# Patient Record
Sex: FEMALE | NOT HISPANIC OR LATINO
[De-identification: names, ages, dates, MRNs, and addresses within clinical notes are randomized per-mention and may not be internally consistent; named-entity substitution may affect disease eponyms.]

---

## 2017-01-12 ENCOUNTER — RECORDS - HEALTHEAST (OUTPATIENT)
Dept: ADMINISTRATIVE | Facility: OTHER | Age: 62
End: 2017-01-12

## 2017-02-14 ENCOUNTER — AMBULATORY - HEALTHEAST (OUTPATIENT)
Dept: NEUROSURGERY | Facility: CLINIC | Age: 62
End: 2017-02-14

## 2017-02-14 ENCOUNTER — OFFICE VISIT - HEALTHEAST (OUTPATIENT)
Dept: NEUROSURGERY | Facility: CLINIC | Age: 62
End: 2017-02-14

## 2017-02-14 DIAGNOSIS — M54.16 LEFT LUMBAR RADICULOPATHY: ICD-10-CM

## 2017-02-14 DIAGNOSIS — R29.898 WEAKNESS OF LEFT LEG: ICD-10-CM

## 2017-02-14 RX ORDER — DULOXETIN HYDROCHLORIDE 60 MG/1
60 CAPSULE, DELAYED RELEASE ORAL 2 TIMES DAILY
Status: SHIPPED | COMMUNITY
Start: 2017-02-14

## 2017-02-14 RX ORDER — PROPRANOLOL HYDROCHLORIDE 10 MG/1
10 TABLET ORAL 3 TIMES DAILY
Status: SHIPPED | COMMUNITY
Start: 2017-02-14

## 2017-02-14 RX ORDER — GABAPENTIN 300 MG/1
300 CAPSULE ORAL 3 TIMES DAILY
Status: SHIPPED | COMMUNITY
Start: 2017-02-14

## 2017-02-14 ASSESSMENT — MIFFLIN-ST. JEOR: SCORE: 1551.25

## 2017-02-19 ENCOUNTER — AMBULATORY - HEALTHEAST (OUTPATIENT)
Dept: NEUROSURGERY | Facility: CLINIC | Age: 62
End: 2017-02-19

## 2017-02-24 ENCOUNTER — COMMUNICATION - HEALTHEAST (OUTPATIENT)
Dept: NEUROSURGERY | Facility: CLINIC | Age: 62
End: 2017-02-24

## 2017-02-28 ENCOUNTER — RECORDS - HEALTHEAST (OUTPATIENT)
Dept: RADIOLOGY | Facility: CLINIC | Age: 62
End: 2017-02-28

## 2017-03-09 ENCOUNTER — AMBULATORY - HEALTHEAST (OUTPATIENT)
Dept: NEUROSURGERY | Facility: CLINIC | Age: 62
End: 2017-03-09

## 2017-03-22 ENCOUNTER — HOSPITAL ENCOUNTER (OUTPATIENT)
Dept: PHYSICAL MEDICINE AND REHAB | Facility: CLINIC | Age: 62
Discharge: HOME | End: 2017-03-22
Attending: NEUROLOGICAL SURGERY

## 2017-03-22 DIAGNOSIS — R29.898 WEAKNESS OF LEFT LEG: ICD-10-CM

## 2017-03-22 DIAGNOSIS — M54.16 LEFT LUMBAR RADICULOPATHY: ICD-10-CM

## 2017-04-11 ENCOUNTER — AMBULATORY - HEALTHEAST (OUTPATIENT)
Dept: NEUROSURGERY | Facility: CLINIC | Age: 62
End: 2017-04-11

## 2017-04-11 ENCOUNTER — OFFICE VISIT - HEALTHEAST (OUTPATIENT)
Dept: NEUROSURGERY | Facility: CLINIC | Age: 62
End: 2017-04-11

## 2017-04-11 ENCOUNTER — COMMUNICATION - HEALTHEAST (OUTPATIENT)
Dept: NEUROSURGERY | Facility: CLINIC | Age: 62
End: 2017-04-11

## 2017-04-11 DIAGNOSIS — M48.061 SPINAL STENOSIS OF LUMBAR REGION AT MULTIPLE LEVELS: ICD-10-CM

## 2017-04-11 DIAGNOSIS — M54.16 LEFT LUMBAR RADICULOPATHY: ICD-10-CM

## 2017-04-11 DIAGNOSIS — M43.16 SPONDYLOLISTHESIS AT L4-L5 LEVEL: ICD-10-CM

## 2017-04-11 DIAGNOSIS — G95.20 CERVICAL CORD COMPRESSION WITH MYELOPATHY (H): ICD-10-CM

## 2017-04-11 RX ORDER — AMLODIPINE BESYLATE 10 MG/1
TABLET ORAL
Status: SHIPPED | COMMUNITY
Start: 2017-03-30

## 2017-04-11 RX ORDER — VALACYCLOVIR HYDROCHLORIDE 1 G/1
TABLET, FILM COATED ORAL
Status: SHIPPED | COMMUNITY
Start: 2017-02-09

## 2017-04-11 ASSESSMENT — MIFFLIN-ST. JEOR: SCORE: 1548.53

## 2017-04-12 ENCOUNTER — RECORDS - HEALTHEAST (OUTPATIENT)
Dept: RADIOLOGY | Facility: CLINIC | Age: 62
End: 2017-04-12

## 2017-04-12 ENCOUNTER — COMMUNICATION - HEALTHEAST (OUTPATIENT)
Dept: NEUROSURGERY | Facility: CLINIC | Age: 62
End: 2017-04-12

## 2017-04-14 RX ORDER — OXYCODONE AND ACETAMINOPHEN 5; 325 MG/1; MG/1
1 TABLET ORAL EVERY 6 HOURS PRN
Qty: 30 TABLET | Refills: 0 | Status: SHIPPED | OUTPATIENT
Start: 2017-04-14

## 2017-04-24 ENCOUNTER — AMBULATORY - HEALTHEAST (OUTPATIENT)
Dept: NEUROSURGERY | Facility: CLINIC | Age: 62
End: 2017-04-24

## 2017-05-22 ENCOUNTER — AMBULATORY - HEALTHEAST (OUTPATIENT)
Dept: NEUROSURGERY | Facility: CLINIC | Age: 62
End: 2017-05-22

## 2021-05-30 VITALS — WEIGHT: 218.6 LBS | BODY MASS INDEX: 34.31 KG/M2 | HEIGHT: 67 IN

## 2021-05-30 VITALS — HEIGHT: 67 IN | BODY MASS INDEX: 34.21 KG/M2 | WEIGHT: 218 LBS

## 2021-06-08 NOTE — PROGRESS NOTES
Neurosurgery consultation was requested by:  Aixa Yin MD  Pain location: back    Radicular Pain is present: left leg pain to left big toe   Lhermitte sign: denies     Motor complaints: left foot weakness  Sensory complaints: Burning, tingling and numbness in the left leg down to left big toe  Gait and balance issues: yes   Bowel or bladder issues: denies      Duration of SX is: 9 months  The symptoms are worse with: walking, lifting, twisting, bending over  The symptoms are better with: rest, taking warm baths  Injury: denies     Severity is: moderate/severe     Patient has tried the following conservative measures: PT- not helpful , Injections - short term relief up to 2 weeks   MOOSE score is: 42 %     Regine, CMA

## 2021-06-09 NOTE — PROGRESS NOTES
The patient presents at the request of Dr. Barriga for left lower extremity EMG.  She has left gluteal and lower extremity pain and numbness and tingling to the lateral ankle.  Reports weakness in her left leg.  Has a history of 2 lumbar surgeries.     EMG/NCS  results: Please see scanned full report.    Comment NCS: Abnormal study  1.  Low amplitude bilateral sural SNAPs and absent left superficial peroneal SNAP  2.  Low amplitude bilateral tibial CMAPs  3.  Normal left peroneal motor study   4.  Absent bilateral tibial H reflex     Comment EMG: Normal study  1.  Normal needle EMG left lower extremity     Interpretation:Abnormal study:    1.  Electrodiagnostic findings are consistent with a sensorimotor polyneuropathy, predominantly axonal.     2.  There is no electrodiagnostic evidence of lumbosacral radiculopathy, lumbosacral plexopathy, or focal neuropathy in the left  lower extremity.      Note: Axonal polyneuropathy include the vast majority of polyneuropathies.  They include nearly all diabetic, toxic, metabolic, drug induced, nutritional, and endocrine associated polyneuropathies.  Further workup for the cause of the polyneuropathy could include evaluation for vitamin B12 deficiency, thyroid disorder, and diabetes.  Other considerations include heavy metal toxicity, connective tissue disorder/rheumatologic source, or medication related.    The testing was completed in its entirety by the physician.      It was our pleasure caring for your patient today, if there any questions or concerns please do not hesitate to contact us.

## 2021-06-10 NOTE — PROGRESS NOTES
Kacie is here to follow up on a cervical MRI and EMG of LLE. She states that her back pain is more intense now. Pain still left leg and left big toe along with numbness, tingling, and burning sensation. She also has weakness in left foot.   She is currently wearing a leg brace that does help.   MOOSE from 2/14/17 was 42%  MOOSE today is 36%  FRANCI Hopper